# Patient Record
Sex: MALE | Race: WHITE
[De-identification: names, ages, dates, MRNs, and addresses within clinical notes are randomized per-mention and may not be internally consistent; named-entity substitution may affect disease eponyms.]

---

## 2020-11-24 ENCOUNTER — HOSPITAL ENCOUNTER (OUTPATIENT)
Dept: HOSPITAL 47 - RADUSWWP | Age: 65
Discharge: HOME | End: 2020-11-24
Payer: COMMERCIAL

## 2020-11-24 DIAGNOSIS — E11.69: ICD-10-CM

## 2020-11-24 DIAGNOSIS — I87.2: Primary | ICD-10-CM

## 2020-11-24 PROCEDURE — 93923 UPR/LXTR ART STDY 3+ LVLS: CPT

## 2020-11-24 NOTE — US
LOWER EXTREMITY VENOUS INSUFFICIENCY

 

CLINICAL HISTORY: 65-year-old male E11.621 TYPE II DM.

 

DATE: 11/24/2020

 

FINDINGS:

 

SIDE PERFORMED:  Left  

 

1)  Color flow is present and patency is documented in the following vessels.  No DVT or SVT is noted
.       

     Common Femoral Vein    

   Deep Femoral Vein

   Femoral Vein

   Popliteal Vein

   Proximal Calf Veins

 

   Greater Saph Vein  

   Upper Small Saph Vein  

 

2)  There is venous reflux noted at the following venous levels:  Mid femoral vein, minimal reflux se
en in the mid and lower greater saphenous vein.

 

 

 

 

 

 

 

IMPRESSION:

 

1. No evidence for DVT within the left lower extremity imaged from the groin to the upper calf.

2. Venous reflux identified in the mid aspect of the femoral vein. Additionally, there is minimal jeanette
ous reflux in the mid and lower greater saphenous vein.

## 2020-11-25 NOTE — P.ARTDOP
Arterial Doppler





LOWER EXTREMITY ARTERIAL DOPPLER:





DATE OF SERVICE: 11/24/2020





Reason for study: Ulcer left leg, postsurgical.





Doppler waveforms: Multiphasic bilaterally throughout, and good toe waveforms.





Pulse volume recording: [].





Pressure gradients: None.





Ankle-brachial indices: Greater than 1 bilaterally.





Toe brachial indices: 0.87 on the right, 0.95 on the left





Impression: Normal study.